# Patient Record
Sex: MALE | Race: WHITE | Employment: FULL TIME | URBAN - METROPOLITAN AREA
[De-identification: names, ages, dates, MRNs, and addresses within clinical notes are randomized per-mention and may not be internally consistent; named-entity substitution may affect disease eponyms.]

---

## 2017-02-21 ENCOUNTER — HOSPITAL ENCOUNTER (INPATIENT)
Facility: HOSPITAL | Age: 72
LOS: 1 days | Discharge: HOME | DRG: 190 | End: 2017-02-22
Attending: EMERGENCY MEDICINE | Admitting: HOSPITALIST
Payer: MEDICARE

## 2017-02-21 ENCOUNTER — APPOINTMENT (OUTPATIENT)
Dept: GENERAL RADIOLOGY | Facility: HOSPITAL | Age: 72
DRG: 190 | End: 2017-02-21
Attending: EMERGENCY MEDICINE
Payer: MEDICARE

## 2017-02-21 DIAGNOSIS — J18.9 COMMUNITY ACQUIRED PNEUMONIA: Primary | ICD-10-CM

## 2017-02-21 PROBLEM — D72.829 LEUKOCYTOSIS: Status: ACTIVE | Noted: 2017-02-21

## 2017-02-21 PROBLEM — D69.6 THROMBOCYTOPENIA (HCC): Status: ACTIVE | Noted: 2017-02-21

## 2017-02-21 PROBLEM — E87.6 HYPOKALEMIA: Status: ACTIVE | Noted: 2017-02-21

## 2017-02-21 PROBLEM — R73.9 HYPERGLYCEMIA: Status: ACTIVE | Noted: 2017-02-21

## 2017-02-21 LAB
ALBUMIN SERPL-MCNC: 3.6 G/DL (ref 3.5–4.8)
ALP LIVER SERPL-CCNC: 36 U/L (ref 45–117)
ALT SERPL-CCNC: 50 U/L (ref 17–63)
AST SERPL-CCNC: 25 U/L (ref 15–41)
ATRIAL RATE: 89 BPM
BASOPHILS # BLD AUTO: 0.04 X10(3) UL (ref 0–0.1)
BASOPHILS NFR BLD AUTO: 0.2 %
BILIRUB SERPL-MCNC: 1.8 MG/DL (ref 0.1–2)
BILIRUB UR QL STRIP.AUTO: NEGATIVE
BUN BLD-MCNC: 19 MG/DL (ref 8–20)
CALCIUM BLD-MCNC: 8.8 MG/DL (ref 8.3–10.3)
CHLORIDE: 104 MMOL/L (ref 101–111)
CLARITY UR REFRACT.AUTO: CLEAR
CO2: 30 MMOL/L (ref 22–32)
COLOR UR AUTO: YELLOW
CREAT BLD-MCNC: 1.06 MG/DL (ref 0.7–1.3)
EOSINOPHIL # BLD AUTO: 0 X10(3) UL (ref 0–0.3)
EOSINOPHIL NFR BLD AUTO: 0 %
ERYTHROCYTE [DISTWIDTH] IN BLOOD BY AUTOMATED COUNT: 12.9 % (ref 11.5–16)
EST. AVERAGE GLUCOSE BLD GHB EST-MCNC: 146 MG/DL (ref 68–126)
GLUCOSE BLD-MCNC: 105 MG/DL (ref 65–99)
GLUCOSE BLD-MCNC: 139 MG/DL (ref 70–99)
GLUCOSE BLD-MCNC: 211 MG/DL (ref 65–99)
GLUCOSE UR STRIP.AUTO-MCNC: NEGATIVE MG/DL
HBA1C MFR BLD HPLC: 6.7 % (ref ?–5.7)
HCT VFR BLD AUTO: 40 % (ref 37–53)
HGB BLD-MCNC: 14.2 G/DL (ref 13–17)
IMMATURE GRANULOCYTE COUNT: 0.12 X10(3) UL (ref 0–1)
IMMATURE GRANULOCYTE RATIO %: 0.6 %
KETONES UR STRIP.AUTO-MCNC: NEGATIVE MG/DL
LACTIC ACID: 2 MMOL/L (ref 0.5–2)
LEUKOCYTE ESTERASE UR QL STRIP.AUTO: NEGATIVE
LYMPHOCYTES # BLD AUTO: 0.82 X10(3) UL (ref 0.9–4)
LYMPHOCYTES NFR BLD AUTO: 4.3 %
M PROTEIN MFR SERPL ELPH: 6.7 G/DL (ref 6.1–8.3)
MCH RBC QN AUTO: 31.4 PG (ref 27–33.2)
MCHC RBC AUTO-ENTMCNC: 35.5 G/DL (ref 31–37)
MCV RBC AUTO: 88.5 FL (ref 80–99)
MONOCYTES # BLD AUTO: 1.67 X10(3) UL (ref 0.1–0.6)
MONOCYTES NFR BLD AUTO: 8.8 %
NEUTROPHIL ABS PRELIM: 16.3 X10 (3) UL (ref 1.3–6.7)
NEUTROPHILS # BLD AUTO: 16.3 X10(3) UL (ref 1.3–6.7)
NEUTROPHILS NFR BLD AUTO: 86.1 %
NITRITE UR QL STRIP.AUTO: NEGATIVE
P AXIS: 57 DEGREES
P-R INTERVAL: 210 MS
PH UR STRIP.AUTO: 6 [PH] (ref 4.5–8)
PLATELET # BLD AUTO: 125 10(3)UL (ref 150–450)
POTASSIUM SERPL-SCNC: 3.5 MMOL/L (ref 3.6–5.1)
POTASSIUM SERPL-SCNC: 3.9 MMOL/L (ref 3.6–5.1)
PROT UR STRIP.AUTO-MCNC: 30 MG/DL
Q-T INTERVAL: 366 MS
QRS DURATION: 90 MS
QTC CALCULATION (BEZET): 445 MS
R AXIS: 22 DEGREES
RBC # BLD AUTO: 4.52 X10(6)UL (ref 3.8–5.8)
RBC UR QL AUTO: NEGATIVE
RED CELL DISTRIBUTION WIDTH-SD: 42.1 FL (ref 35.1–46.3)
RESPIRATORY PANEL NEG:: NEGATIVE
SODIUM SERPL-SCNC: 141 MMOL/L (ref 136–144)
SP GR UR STRIP.AUTO: 1.02 (ref 1–1.03)
T AXIS: 71 DEGREES
UROBILINOGEN UR STRIP.AUTO-MCNC: <2 MG/DL
VENTRICULAR RATE: 89 BPM
WBC # BLD AUTO: 19 X10(3) UL (ref 4–13)

## 2017-02-21 PROCEDURE — 99223 1ST HOSP IP/OBS HIGH 75: CPT | Performed by: INTERNAL MEDICINE

## 2017-02-21 PROCEDURE — 71010 XR CHEST AP PORTABLE  (CPT=71010): CPT

## 2017-02-21 RX ORDER — POTASSIUM CHLORIDE 20 MEQ/1
40 TABLET, EXTENDED RELEASE ORAL EVERY 4 HOURS
Status: COMPLETED | OUTPATIENT
Start: 2017-02-21 | End: 2017-02-21

## 2017-02-21 RX ORDER — SODIUM CHLORIDE 9 MG/ML
INJECTION, SOLUTION INTRAVENOUS CONTINUOUS
Status: ACTIVE | OUTPATIENT
Start: 2017-02-21 | End: 2017-02-21

## 2017-02-21 RX ORDER — LISINOPRIL 20 MG/1
20 TABLET ORAL DAILY
COMMUNITY

## 2017-02-21 RX ORDER — CHOLECALCIFEROL (VITAMIN D3) 50 MCG
1 CAPSULE ORAL DAILY
COMMUNITY

## 2017-02-21 RX ORDER — ENOXAPARIN SODIUM 100 MG/ML
40 INJECTION SUBCUTANEOUS DAILY
Status: DISCONTINUED | OUTPATIENT
Start: 2017-02-21 | End: 2017-02-22

## 2017-02-21 RX ORDER — PRAVASTATIN SODIUM 10 MG
10 TABLET ORAL NIGHTLY
COMMUNITY
End: 2017-02-21

## 2017-02-21 RX ORDER — MELOXICAM 10 MG/1
7.5 CAPSULE ORAL DAILY
COMMUNITY
End: 2017-02-21

## 2017-02-21 RX ORDER — ONDANSETRON 2 MG/ML
4 INJECTION INTRAMUSCULAR; INTRAVENOUS ONCE
Status: COMPLETED | OUTPATIENT
Start: 2017-02-21 | End: 2017-02-21

## 2017-02-21 RX ORDER — ACETAMINOPHEN 500 MG
1000 TABLET ORAL ONCE
Status: COMPLETED | OUTPATIENT
Start: 2017-02-21 | End: 2017-02-21

## 2017-02-21 RX ORDER — ONDANSETRON 2 MG/ML
4 INJECTION INTRAMUSCULAR; INTRAVENOUS EVERY 6 HOURS PRN
Status: DISCONTINUED | OUTPATIENT
Start: 2017-02-21 | End: 2017-02-22

## 2017-02-21 RX ORDER — ACETAMINOPHEN 500 MG
1000 TABLET ORAL ONCE
Status: DISCONTINUED | OUTPATIENT
Start: 2017-02-21 | End: 2017-02-21

## 2017-02-21 RX ORDER — SODIUM CHLORIDE 9 MG/ML
1000 INJECTION, SOLUTION INTRAVENOUS ONCE
Status: COMPLETED | OUTPATIENT
Start: 2017-02-21 | End: 2017-02-21

## 2017-02-21 RX ORDER — SILDENAFIL 100 MG/1
100 TABLET, FILM COATED ORAL
COMMUNITY

## 2017-02-21 RX ORDER — ASPIRIN 81 MG/1
81 TABLET ORAL DAILY
COMMUNITY

## 2017-02-21 RX ORDER — PRAVASTATIN SODIUM 10 MG
10 TABLET ORAL NIGHTLY
Status: DISCONTINUED | OUTPATIENT
Start: 2017-02-21 | End: 2017-02-22

## 2017-02-21 RX ORDER — SODIUM CHLORIDE 9 MG/ML
INJECTION, SOLUTION INTRAVENOUS ONCE
Status: DISCONTINUED | OUTPATIENT
Start: 2017-02-21 | End: 2017-02-21

## 2017-02-21 RX ORDER — IBUPROFEN 400 MG/1
400 TABLET ORAL 4 TIMES DAILY
Status: DISCONTINUED | OUTPATIENT
Start: 2017-02-21 | End: 2017-02-22

## 2017-02-21 RX ORDER — SODIUM CHLORIDE 9 MG/ML
INJECTION, SOLUTION INTRAVENOUS CONTINUOUS
Status: DISCONTINUED | OUTPATIENT
Start: 2017-02-21 | End: 2017-02-22

## 2017-02-21 RX ORDER — IPRATROPIUM BROMIDE AND ALBUTEROL SULFATE 2.5; .5 MG/3ML; MG/3ML
3 SOLUTION RESPIRATORY (INHALATION) EVERY 4 HOURS PRN
Status: DISCONTINUED | OUTPATIENT
Start: 2017-02-21 | End: 2017-02-22

## 2017-02-21 RX ORDER — ACETAMINOPHEN 325 MG/1
650 TABLET ORAL EVERY 6 HOURS PRN
Status: DISCONTINUED | OUTPATIENT
Start: 2017-02-21 | End: 2017-02-22

## 2017-02-21 RX ORDER — MELOXICAM 7.5 MG/1
7.5 TABLET ORAL DAILY
COMMUNITY

## 2017-02-21 RX ORDER — LISINOPRIL 20 MG/1
20 TABLET ORAL DAILY
Status: DISCONTINUED | OUTPATIENT
Start: 2017-02-21 | End: 2017-02-22

## 2017-02-21 RX ORDER — PRAVASTATIN SODIUM 80 MG/1
40 TABLET ORAL NIGHTLY
COMMUNITY

## 2017-02-21 RX ORDER — DEXTROSE MONOHYDRATE 25 G/50ML
50 INJECTION, SOLUTION INTRAVENOUS
Status: DISCONTINUED | OUTPATIENT
Start: 2017-02-21 | End: 2017-02-22

## 2017-02-21 RX ORDER — ASPIRIN 81 MG/1
81 TABLET ORAL DAILY
Status: DISCONTINUED | OUTPATIENT
Start: 2017-02-21 | End: 2017-02-22

## 2017-02-21 RX ORDER — CHLORAL HYDRATE 500 MG
1000 CAPSULE ORAL DAILY
COMMUNITY

## 2017-02-21 RX ORDER — ACETAMINOPHEN 325 MG/1
325 TABLET ORAL EVERY 6 HOURS PRN
COMMUNITY

## 2017-02-21 RX ORDER — METOPROLOL SUCCINATE 50 MG/1
25 TABLET, EXTENDED RELEASE ORAL DAILY
COMMUNITY

## 2017-02-21 NOTE — PLAN OF CARE
Problem: Patient/Family Goals  Goal: Patient/Family Long Term Goal  Patient’s Long Term Goal: 2/21-resolve pna    Interventions:  2/21-iv abx  - See additional Care Plan goals for specific interventions   Outcome: Progressing

## 2017-02-21 NOTE — PLAN OF CARE
Diabetes/Glucose Control    • Glucose maintained within prescribed range Progressing        PAIN - ADULT    • Verbalizes/displays adequate comfort level or patient's stated pain goal Progressing        Patient/Family Goals    • Patient/Family [facility]

## 2017-02-21 NOTE — ED NOTES
Patient given turkey sandwich lunch pack and apple juice. Family at bedside. Patient updated on plan of care.

## 2017-02-21 NOTE — H&P
[facility] HOSPITALIST  History and Physical     Jacqualine Branch Patient Status:  Emergency    1945 MRN [id number]   Location [facility] Attending Ezra Pérez MD   Hosp Day # 0 PCP No primary care provider on file. bowel sounds  Neurologic: No focal neurological deficits. CNII-XII grossly intact. Musculoskeletal: Moves all extremities. Extremities: No edema or cyanosis. Integument: No rashes or lesions. Psychiatric: Appropriate mood and affect.       Diagnostic D

## 2017-02-21 NOTE — PROGRESS NOTES
Pt is a 79 y/o male admitted with PNA. alert oriented. denies any SOB. on room air 02 sat 95-98%. pt is on iv zithromax and Rocephin.getting IVF. no swallowing difficulty noted. PT,OT,speech consulted.k-replaced. no acute issues noted. will monitor closely.

## 2017-02-21 NOTE — PLAN OF CARE
Problem: Patient/Family Goals  Goal: Patient/Family Short Term Goal  Patient’s Short Term Goal: 2/21-Resolve pna    Interventions:   2/21-iv abx  - See additional Care Plan goals for specific interventions   Outcome: Progressing

## 2017-02-22 VITALS
OXYGEN SATURATION: 98 % | SYSTOLIC BLOOD PRESSURE: 137 MMHG | DIASTOLIC BLOOD PRESSURE: 61 MMHG | HEART RATE: 76 BPM | TEMPERATURE: 98 F | RESPIRATION RATE: 18 BRPM | WEIGHT: 236.19 LBS | HEIGHT: 72 IN | BODY MASS INDEX: 31.99 KG/M2

## 2017-02-22 LAB
BUN BLD-MCNC: 17 MG/DL (ref 8–20)
CALCIUM BLD-MCNC: 8.1 MG/DL (ref 8.3–10.3)
CHLORIDE: 108 MMOL/L (ref 101–111)
CO2: 26 MMOL/L (ref 22–32)
CREAT BLD-MCNC: 0.89 MG/DL (ref 0.7–1.3)
ERYTHROCYTE [DISTWIDTH] IN BLOOD BY AUTOMATED COUNT: 13.3 % (ref 11.5–16)
GLUCOSE BLD-MCNC: 106 MG/DL (ref 65–99)
GLUCOSE BLD-MCNC: 113 MG/DL (ref 65–99)
GLUCOSE BLD-MCNC: 140 MG/DL (ref 65–99)
GLUCOSE BLD-MCNC: 149 MG/DL (ref 70–99)
GLUCOSE BLD-MCNC: 169 MG/DL (ref 65–99)
HCT VFR BLD AUTO: 37.9 % (ref 37–53)
HGB BLD-MCNC: 12.7 G/DL (ref 13–17)
MCH RBC QN AUTO: 31.3 PG (ref 27–33.2)
MCHC RBC AUTO-ENTMCNC: 33.5 G/DL (ref 31–37)
MCV RBC AUTO: 93.3 FL (ref 80–99)
PLATELET # BLD AUTO: 102 10(3)UL (ref 150–450)
POTASSIUM SERPL-SCNC: 4.1 MMOL/L (ref 3.6–5.1)
RBC # BLD AUTO: 4.06 X10(6)UL (ref 3.8–5.8)
RED CELL DISTRIBUTION WIDTH-SD: 45.1 FL (ref 35.1–46.3)
SODIUM SERPL-SCNC: 141 MMOL/L (ref 136–144)
WBC # BLD AUTO: 14 X10(3) UL (ref 4–13)

## 2017-02-22 PROCEDURE — 99239 HOSP IP/OBS DSCHRG MGMT >30: CPT | Performed by: HOSPITALIST

## 2017-02-22 RX ORDER — AZITHROMYCIN 250 MG/1
500 TABLET, FILM COATED ORAL ONCE
Qty: 2 TABLET | Refills: 0 | Status: SHIPPED | OUTPATIENT
Start: 2017-02-23 | End: 2017-02-23

## 2017-02-22 RX ORDER — CEFUROXIME AXETIL 500 MG/1
500 TABLET ORAL 2 TIMES DAILY
Qty: 10 TABLET | Refills: 0 | Status: SHIPPED | OUTPATIENT
Start: 2017-02-22 | End: 2017-02-27

## 2017-02-22 RX ORDER — ATORVASTATIN CALCIUM 20 MG/1
20 TABLET, FILM COATED ORAL NIGHTLY
Status: DISCONTINUED | OUTPATIENT
Start: 2017-02-22 | End: 2017-02-22

## 2017-02-22 RX ORDER — METOPROLOL SUCCINATE 25 MG/1
25 TABLET, EXTENDED RELEASE ORAL
Status: DISCONTINUED | OUTPATIENT
Start: 2017-02-22 | End: 2017-02-22

## 2017-02-22 NOTE — OCCUPATIONAL THERAPY NOTE
OCCUPATIONAL THERAPY EVALUATION - INPATIENT     Room Number: 530/530-A  Evaluation Date: 2/22/2017  Type of Evaluation: Initial  Presenting Problem: PNA and fever     Physician Order: IP Consult to Occupational Therapy  Reason for Therapy: ADL/IADL Dysfunc limits    Upper extremity strength is within functional limits     COORDINATION  Gross Motor    WFL; tremor in B UE's     Fine Motor    WFL      ADDITIONAL TESTS                       Other Test(s): [place] IADL scale: 3/8              IADL  Pt scored a 3/8 reach. Pt's RN/PCT was made aware of pt's present position and condition. Patient End of Session: Up in chair;Needs met;Call light within reach;RN aware of session/findings; All patient questions and concerns addressed    ASSESSMENT     Patient is a 80 y

## 2017-02-22 NOTE — PHYSICAL THERAPY NOTE
PHYSICAL THERAPY EVALUATION - INPATIENT     Room Number: 530/530-A  Evaluation Date: 2/22/2017  Type of Evaluation: Initial  Physician Order: PT Eval and Treat    Presenting Problem: Pneumonia  Reason for Therapy: Mobility Dysfunction and Discharge Kerwin TOLERANCE  Room air    AM-PAC '6-Clicks' INPATIENT SHORT FORM - BASIC MOBILITY  How much difficulty does the patient currently have. ..  -   Turning over in bed (including adjusting bedclothes, sheets and blankets)?: None   -   Sitting down on and standing session/findings; All patient questions and concerns addressed    ASSESSMENT     Patient is a 80year old male admitted 2/21/2017 for pneumonia.    In this PT evaluation, the patient presents with the following impairments: impaired balance and activity tole

## 2017-02-22 NOTE — PLAN OF CARE
Diabetes/Glucose Control    • Glucose maintained within prescribed range Progressing        PAIN - ADULT    • Verbalizes/displays adequate comfort level or patient's stated pain goal Progressing        Patient/Family Goals    • Patient/Family [facility]

## 2017-02-22 NOTE — PROGRESS NOTES
SPO2% ON ROOM AIR AT REST 98%    SPO2% AMBULATION ON ROOM AIR 93%    SPO2% AMBULATION ON O2 93% ON 0 LITERS PER MINIUTE

## 2017-02-22 NOTE — PLAN OF CARE
Patient/Family Goals    • Patient/Family Long Term Goal Progressing    • Patient/Family Short Term Goal Progressing        RESPIRATORY - ADULT    • Achieves optimal ventilation and oxygenation Progressing        Problem: pneumonia  Data: Patient alert and

## 2017-02-22 NOTE — PROGRESS NOTES
Pt is a 81 y/o male admitted with PNA. alert oriented. denies any SOB. on room air 02 sat 95-98%. pt is on iv zithromax and Rocephin.getting IVF. no swallowing difficulty noted. PT,OT,speech consulted. no acute issues noted. will monitor closely.   Pt has diarrhea,

## 2017-02-22 NOTE — PROGRESS NOTES
SPO2% ON ROOM AIR AT REST 97%    SPO2% AMBULATION ON ROOM AIR 93%    SPO2% AMBULATION ON O2 93% ON 0 LITERS PER MINIUTE

## 2017-02-22 NOTE — PROGRESS NOTES
[facility] Pharmacy Note: Antimicrobial Weight Dose Adjustment for: Rocephin (ceftriaxone)    Caitlin Mckeon is a 80year old male who has been prescribed Rocephin (ceftriaxone). CrCl is estimated creatinine clearance is 84.8 mL/min (based on Cr of 0.89).  and

## 2017-02-22 NOTE — SLP NOTE
ADULT SWALLOWING EVALUATION    ASSESSMENT & PLAN   ASSESSMENT  Pt was seen for a BSSE per pneumonia protocol. Pt was adm with community acquired pneumonia.   Pt was sitting up in chair and conversational.  Pt reports feeling ill and tired for past few time Impression  CXR: 2/21/17     CONCLUSION:      Positive for pneumonia at the left lung base, patchy, either the lingula or left lower lobe. Right lung clear. Heart probably normal size for technique.  No sizable effusion, but small effusion difficult to ex UP     Number of Visits to Meet Established Goals: 0  Follow Up Needed: No       Thank you for your referral.   If you have any questions, please contact Mariposa Graves MS   CCC/SLP

## 2017-02-22 NOTE — PROGRESS NOTES
NURSING DISCHARGE NOTE    Discharged Home via Wheelchair. Accompanied by Support staff  Belongings Taken by patient/family. Pt discharged to home. discharge instruction given to pt,verbalized understanding,heplock discontinued. pts family take him home

## 2017-02-22 NOTE — PROGRESS NOTES
[facility] HOSPITALIST  Progress Note     [facility] Patient Status:  Inpatient    1945 MRN [id number]   [facility] 5NW-A Attending Iveth Torres MD   Hosp Day # 1 PCP No primary care provider on file.      Chief Complaint: dyspnea Oral Daily   • ibuprofen  400 mg Oral QID   • azithromycin  500 mg Intravenous Q24H   • enoxaparin  40 mg Subcutaneous Daily   • insulin aspart  1-5 Units Subcutaneous TID CC and HS       ASSESSMENT / PLAN:     1. PNA  1. Cont ABX  2. COPD  1. Stable  2.  C

## 2017-02-22 NOTE — ED PROVIDER NOTES
Patient Seen in: [facility] 5nw-a    History   Patient presents with:  Fever Sepsis (infectious)    Stated Complaint: flu    HPI    This is a 56-year-old male complaining of fever and cough the patient states he developed fever cough weakness starte HPI.  Constitutional and vital signs reviewed. All other systems reviewed and negative except as noted above. PSFH elements reviewed from today and agreed except as otherwise stated in HPI.     Physical Exam     ED Triage Vitals   BP 02/21/17 1156 1 following:     POC Glucose 105 (*)     All other components within normal limits   POCT GLUCOSE - Abnormal; Notable for the following:     POC Glucose 140 (*)     All other components within normal limits   POCT GLUCOSE - Abnormal; Notable for the followin POA    * (Principal)Community acquired pneumonia J18.9 2/21/2017 Yes    Hyperglycemia R73.9 2/21/2017 Yes    Hypokalemia E87.6 2/21/2017 Yes    Leukocytosis D72.829 2/21/2017 Yes    Thrombocytopenia ([address] D69.6 2/21/2017 Yes

## 2017-02-22 NOTE — PLAN OF CARE
Problem: Patient/Family Goals  Goal: Patient/Family Short Term Goal  Patient’s Short Term Goal: 2/21-Resolve pna  2/21 noc: get a good night’s sleep  2/22-resolve pna  Interventions:   2/22-iv abx,breath easy    2/21-iv abx  - See additional Care Plan goal

## 2017-02-23 NOTE — DISCHARGE SUMMARY
[facility] HOSPITALIST  DISCHARGE SUMMARY     Constantino Boles Patient Status:  Inpatient    1945 MRN [id number]   [facility] 5NW-A Attending No att. providers found   [address] Day # 1 PCP No primary care provider on file.      Date of Admissio mouth 2 (two) times daily.     Stop taking on:  2/27/2017   Quantity:  10 tablet   Refills:  0         CONTINUE taking these medications       Instructions Prescription details    acetaminophen 325 MG Tabs   Last time this was given:  1,000 mg on 2/21/2017 location directed by your doctor or nurse     Bring a paper prescription for each of these medications    - azithromycin 250 MG Tabs  - Cefuroxime Axetil 500 MG Tabs          Follow-up appointment: Your PCP    Schedule an appointment as soon as possible fo

## 2021-06-01 NOTE — IP AVS SNAPSHOT
Notified patient of results/note as listed below.mh  Message left   [facility] [place] [address] [facility], [address] ~ [phone]                Discharge Summary   2/21/2017    Jeffory Simmonds           Admission Information        Provider Department    2/21/2017 Shanna Valles MD  5nw-A Last time this was given:  81 mg on 2/22/2017  7:51 AM        Take 81 mg by mouth daily. Cholecalciferol 2000 units Caps        Take 2,000 Units by mouth daily.                             HM VITAMIN B COMPLEX/VITAMIN C Tabs Schedule an appointment as soon as possible for a visit with Your PCP. Immunization History as of 2/22/2017  Reviewed on 2/21/2017    No immunizations on file.       Recent Hematology Lab Results  (Last 3 results in the past 90 days)    WBC RBC Hemogl - If you are a smoker or have smoked in the last 12 months, we encourage you to explore options for quitting.     - If you have concerns related to behavioral health issues or thoughts of harming yourself, contact [facility] a provide you with additional printed information. Not all patients will experience these side effects or respond to medications the same. Please call your provider or healthcare team if you have any questions regarding your medications while at home. heartbeat    What to report to your healthcare team:  Low blood sugar (less than 70) twice a week or high blood sugar (greater than 200) for more than 2-3 days           Non-Narcotic Pain Medications     acetaminophen 325 MG Oral Tab    aspirin 81 MG Oral

## 2022-07-01 ENCOUNTER — TELEPHONE (OUTPATIENT)
Dept: SURGERY | Facility: CLINIC | Age: 77
End: 2022-07-01

## 2022-07-01 NOTE — TELEPHONE ENCOUNTER
Rec'd fax from [facility] for referral and medical records. Pt scheduled for NP ov for 9/21/22. Placing medical records in nurses bin for pickup.

## 2022-07-29 ENCOUNTER — TELEPHONE (OUTPATIENT)
Dept: NEUROLOGY | Facility: CLINIC | Age: 77
End: 2022-07-29

## 2022-07-29 NOTE — TELEPHONE ENCOUNTER
Spoke with samra Comer to add patient on to schedule for 8/11/2022 at 4 pm.    Routed to  to create opening in schedule.

## 2022-08-11 ENCOUNTER — OFFICE VISIT (OUTPATIENT)
Dept: NEUROLOGY | Facility: CLINIC | Age: 77
End: 2022-08-11
Payer: OTHER GOVERNMENT

## 2022-08-11 VITALS
BODY MASS INDEX: 28 KG/M2 | HEART RATE: 71 BPM | WEIGHT: 204 LBS | DIASTOLIC BLOOD PRESSURE: 67 MMHG | SYSTOLIC BLOOD PRESSURE: 127 MMHG

## 2022-08-11 DIAGNOSIS — G25.0 ESSENTIAL TREMOR: Primary | ICD-10-CM

## 2022-08-11 DIAGNOSIS — G62.9 NEUROPATHY: ICD-10-CM

## 2022-08-11 RX ORDER — ALPRAZOLAM 0.5 MG/1
TABLET ORAL
COMMUNITY
Start: 2022-07-14

## 2022-08-11 RX ORDER — LISINOPRIL AND HYDROCHLOROTHIAZIDE 25; 20 MG/1; MG/1
1 TABLET ORAL DAILY
COMMUNITY
Start: 2022-06-01

## 2022-08-11 NOTE — PROGRESS NOTES
Patient reports he has neuropathy in the bottom of both feet that began 6-8 years ago. He reports numbness in the bottom of his feet that he notes has been worsening. Patient also reports he has had tremors for a \"long time\". He notes both hands have tremors, he reports he had a severe virus in June and July of 2022 that worsened tremors.

## 2022-08-18 ENCOUNTER — TELEPHONE (OUTPATIENT)
Dept: NEUROLOGY | Facility: CLINIC | Age: 77
End: 2022-08-18

## 2022-08-18 DIAGNOSIS — G25.0 ESSENTIAL TREMOR: Primary | ICD-10-CM

## 2022-08-18 RX ORDER — PRIMIDONE 50 MG/1
50 TABLET ORAL NIGHTLY
Qty: 90 TABLET | Refills: 1 | Status: SHIPPED | OUTPATIENT
Start: 2022-08-18

## 2022-08-18 NOTE — TELEPHONE ENCOUNTER
Per discussion with provider, patient to take 50 mg of Mysoline nightly. Ordered to patient's preferred pharmacy.

## 2022-11-03 ENCOUNTER — TELEPHONE (OUTPATIENT)
Dept: SURGERY | Facility: CLINIC | Age: 77
End: 2022-11-03

## 2022-11-03 NOTE — TELEPHONE ENCOUNTER
Patient calling to request refill of [facility], [address] [phone], [address] [facility]         Patient informed of 48 hour refill policy excluding weekends and holidays. Informed patient prescription is sent directly to pharmacy. Further explained patient will not receive a call back once prescription is ready.

## 2022-11-03 NOTE — TELEPHONE ENCOUNTER
Per chart review, pt has refill on file.   Verified with pharmacist, states they will call pt when ready for pickup

## 2022-11-12 DIAGNOSIS — G25.0 ESSENTIAL TREMOR: ICD-10-CM

## 2022-11-14 RX ORDER — PRIMIDONE 50 MG/1
TABLET ORAL
Qty: 90 TABLET | Refills: 0 | OUTPATIENT
Start: 2022-11-14

## 2022-11-16 ENCOUNTER — TELEPHONE (OUTPATIENT)
Dept: SURGERY | Facility: CLINIC | Age: 77
End: 2022-11-16

## 2022-11-16 NOTE — TELEPHONE ENCOUNTER
Pt's son called; Pt was doing very well. Last week he had an episode at work. He fell against the wall and slumped down to the floor. Now he has dizziness and is shaking very bad. He is taking 100 mg of his medication all at night, rather than 50 morning, 50 at night. Please advise.

## 2022-11-17 NOTE — TELEPHONE ENCOUNTER
Provider spoke with patient today 11/17/2022. Routed to provider for documentation of phone conversation. RN witnessed conversation and patient was advised to go to ER.

## 2022-12-07 ENCOUNTER — TELEPHONE (OUTPATIENT)
Dept: NEUROLOGY | Facility: CLINIC | Age: 77
End: 2022-12-07

## 2022-12-07 NOTE — TELEPHONE ENCOUNTER
I tried calling Mr. Елена Buckley back regarding his new symptoms. He did not answer and I left a VM on his machine.

## 2023-02-10 DIAGNOSIS — G25.0 ESSENTIAL TREMOR: ICD-10-CM

## 2023-02-10 RX ORDER — PRIMIDONE 50 MG/1
TABLET ORAL
Qty: 90 TABLET | Refills: 0 | Status: SHIPPED | OUTPATIENT
Start: 2023-02-10

## 2023-03-13 ENCOUNTER — TELEPHONE (OUTPATIENT)
Dept: NEUROLOGY | Facility: CLINIC | Age: 78
End: 2023-03-13

## 2023-03-13 NOTE — TELEPHONE ENCOUNTER
Called pt and offered appt on 3/15/2023 at 1100. Pt states he is unable to come to any appt unless it is after 3:15 pt as he works 7 am -3 pm.    Pt states he has an appt at the [place] and he would like to discuss his Parkinsonian like features as the [place] is currently doing a payment to people with symptoms like this. RN advised that pt that RN would continue to review schedule for any appts that open in the afternoon.  GEGE

## 2023-04-16 DIAGNOSIS — G25.0 ESSENTIAL TREMOR: ICD-10-CM

## 2023-04-18 RX ORDER — PRIMIDONE 50 MG/1
TABLET ORAL
Qty: 90 TABLET | Refills: 0 | Status: SHIPPED | OUTPATIENT
Start: 2023-04-18

## 2023-04-25 ENCOUNTER — OFFICE VISIT (OUTPATIENT)
Dept: NEUROLOGY | Facility: CLINIC | Age: 78
End: 2023-04-25
Payer: OTHER GOVERNMENT

## 2023-04-25 VITALS
DIASTOLIC BLOOD PRESSURE: 78 MMHG | RESPIRATION RATE: 18 BRPM | HEART RATE: 72 BPM | WEIGHT: 185 LBS | SYSTOLIC BLOOD PRESSURE: 131 MMHG | BODY MASS INDEX: 25 KG/M2

## 2023-04-25 DIAGNOSIS — G25.0 ESSENTIAL TREMOR: ICD-10-CM

## 2023-04-25 PROCEDURE — 99213 OFFICE O/P EST LOW 20 MIN: CPT | Performed by: OTHER

## 2023-04-25 PROCEDURE — 3078F DIAST BP <80 MM HG: CPT | Performed by: OTHER

## 2023-04-25 PROCEDURE — 3075F SYST BP GE 130 - 139MM HG: CPT | Performed by: OTHER

## 2023-04-25 RX ORDER — PRIMIDONE 50 MG/1
150 TABLET ORAL NIGHTLY
Qty: 270 TABLET | Refills: 1 | Status: CANCELLED | OUTPATIENT
Start: 2023-04-25

## 2023-04-25 RX ORDER — PRIMIDONE 250 MG/1
250 TABLET ORAL DAILY
Qty: 90 TABLET | Refills: 1 | Status: SHIPPED | OUTPATIENT
Start: 2023-04-25

## 2023-04-25 NOTE — PROGRESS NOTES
Pt reports that his tremors have been stable. Pt would like new prescription as he states he is now taking 150 mg of Primidone. Chart reflects 50 mg nightly.

## 2023-10-30 DIAGNOSIS — G25.0 ESSENTIAL TREMOR: ICD-10-CM

## 2023-10-30 RX ORDER — PRIMIDONE 250 MG/1
250 TABLET ORAL DAILY
Qty: 30 TABLET | Refills: 0 | Status: SHIPPED | OUTPATIENT
Start: 2023-10-30

## 2023-10-30 NOTE — TELEPHONE ENCOUNTER
Medication: Primidone     Date of last refill: 4/25/23 (#90/1)  [place]      Last office visit: 4/25/2023  Due back to clinic per last office note:  4 months  Date next office visit scheduled:    Future Appointments   Date Time Provider [place]   11/2/2023  2:00 PM DO SHARLENE Krause [address] [place] [address] note recommendation:    1.  Essential Tremors  - Mysoline 250mg PO Qday      Return in about 4 months (around 8/25/2023

## 2023-10-30 NOTE — TELEPHONE ENCOUNTER
Pt calling to request prescription refill for     primidone 250 MG Oral Tab     [facility] [address] - [address] [place] [address], [phone]

## 2023-11-02 ENCOUNTER — OFFICE VISIT (OUTPATIENT)
Dept: NEUROLOGY | Facility: CLINIC | Age: 78
End: 2023-11-02
Payer: MEDICARE

## 2023-11-02 VITALS
HEART RATE: 76 BPM | RESPIRATION RATE: 17 BRPM | BODY MASS INDEX: 27 KG/M2 | SYSTOLIC BLOOD PRESSURE: 126 MMHG | DIASTOLIC BLOOD PRESSURE: 76 MMHG | WEIGHT: 200.63 LBS

## 2023-11-02 DIAGNOSIS — G25.0 ESSENTIAL TREMOR: ICD-10-CM

## 2023-11-02 PROCEDURE — 99213 OFFICE O/P EST LOW 20 MIN: CPT | Performed by: OTHER

## 2023-11-02 RX ORDER — ALLOPURINOL 300 MG/1
1 TABLET ORAL DAILY
COMMUNITY
Start: 2023-06-27

## 2023-11-02 RX ORDER — PRIMIDONE 250 MG/1
250 TABLET ORAL DAILY
Qty: 90 TABLET | Refills: 1 | Status: CANCELLED | OUTPATIENT
Start: 2023-11-02

## 2023-11-02 RX ORDER — PRIMIDONE 50 MG/1
TABLET ORAL
COMMUNITY
Start: 2022-11-18 | End: 2023-11-02 | Stop reason: ALTCHOICE

## 2023-11-02 RX ORDER — MECLIZINE HCL 12.5 MG/1
12.5 TABLET ORAL
COMMUNITY
Start: 2022-11-18

## 2023-11-02 RX ORDER — PRIMIDONE 125 MG/1
TABLET ORAL
Qty: 270 TABLET | Refills: 1 | Status: SHIPPED | OUTPATIENT
Start: 2023-11-02 | End: 2023-11-06

## 2023-11-02 NOTE — PROGRESS NOTES
Neurology H&P    Bang Davila Patient Status:  No patient class for patient encounter    1945 MRN [id number]   Location [address]  Attending No att. providers found   Hosp Day # 0 PCP No primary care provider on file. Subjective:  Initial Clinic HPI 22  Bang Davila is a(n) 69year old male with a PMH significant for COPD, HTN, HL and who comes to the neurology clinic for evaluation of tremors. He states that he has had these in his hands for a few years, He also reports that he has a neuropathy in the feet. He is a vet and reports that he was exposed to agent orange and is seeing someone at the [place] for this. He also feels that he had COVID in . He reports that he was very ill from this and that since that time he has had achiness in his joints and muscles. Tremors: He states that he has had fine motor tremors in his hands for years. He has a son with these similar tremors. He sleeps about 7-7.5 hours per night. He drinks 1 cup coffee per day. He only rarely drinks EtOH. He does not notice if his tremors are worse or better with lack of sleep or EtOH or caffeine. He does feel that they get worse when he is anxious or nervous. Neuropathy: He has had DM-II for maybe 6-7 years and also reports that he has been exposed to agent orange which he states can cause neuropathy. He states that this is not painful at all. He states that it feels fuzzy on the bottoms of his feet. He sometimes feels shkely when he stated up but has not had any falls. He uses a cane for balance. He is about to see PT. He has no numbness or tingling in the hands, He has no radiating neck or low back pains. Interim History:   Pt was last seen in the clinic on 2023. Since that he has been taking primidone which he states helps a lot with his tremors. He is taking 250mg Qday. He feels that his tremors are pretty well controlled.  He has bilateral rotator cuff problems which may be contributing to some pain and weakness. Current Medications:  Current Outpatient Medications   Medication Sig Dispense Refill    allopurinol 300 MG Oral Tab Take 1 tablet (300 mg total) by mouth daily. meclizine 12.5 MG Oral Tab 1 tablet (12.5 mg total). primidone 250 MG Oral Tab Take 1 tablet (250 mg total) by mouth daily. 30 tablet 0    lisinopril-hydroCHLOROthiazide 20-25 MG Oral Tab Take 1 tablet by mouth daily. acetaminophen 325 MG Oral Tab Take 1 tablet (325 mg total) by mouth every 6 (six) hours as needed for Pain. Pt takes two or three tablets daily for the last 2-3 years      Multiple Vitamins-Minerals (MULTIVITAL) Oral Tab Take 1 tablet by mouth daily. Nutritional Supplements (LIVER DEFENSE) Oral Tab Take 1 tablet by mouth daily. Metoprolol Succinate ER 50 MG Oral Tablet 24 Hr Take 0.5 tablets (25 mg total) by mouth daily. Pravastatin Sodium 80 MG Oral Tab Take 0.5 tablets (40 mg total) by mouth nightly. Sildenafil Citrate 100 MG Oral Tab Take 1 tablet (100 mg total) by mouth daily as needed for Erectile Dysfunction. Cholecalciferol 2000 units Oral Cap Take 2,000 Units by mouth daily. omega-3 fatty acids 1000 MG Oral Cap Take 1,000 mg by mouth daily. MetFORMIN HCl 1000 MG Oral Tab Take 0.5 tablets (500 mg total) by mouth 2 (two) times daily with meals.          Problem List:  Patient Active Problem List:     Hypokalemia     Hyperglycemia     Thrombocytopenia (HCC)     Leukocytosis     Community acquired pneumonia     Pure hypercholesterolemia     Chronic obstructive pulmonary disease (HCC)     Benign essential HTN     Essential tremor     Neuropathy      PMHx:  Past Medical History:   Diagnosis Date    COPD (chronic obstructive pulmonary disease) ([facility] [address]     Diabetes ([facility] [address]     Essential hypertension     Neuropathy     Tremors of nervous system        PSHx:  Past Surgical History:   Procedure Laterality Date    EXCIS LUMBAR [place] Barrier LEVEL         SocHx:  Social History     Socioeconomic History    Marital status:    Tobacco Use    Smoking status: Former    Smokeless tobacco: Never   Vaping Use    Vaping Use: Never used   Substance and Sexual Activity    Alcohol use: Yes     Comment: occ    Drug use: Never   Other Topics Concern    Caffeine Concern Yes     Comment: coffee daily    Exercise No       Family History:  No family history on file. Nio FH of PD    ROS:  10 point ROS completed and was negative, except for pertinent positive and negatives stated in subjective. Objective/Physical Exam:    Vital Signs:  Blood pressure 126/76, pulse 76, resp. rate 17, weight 200 lb 9.9 oz (91 kg). Gen: Awake and in no apparent distress  HEENT: moist mucus membranes  Neck: Supple  Cardiovascular: Regular rate and rhythm, no murmur  Pulm: CTAB  GI: non-tender, normal bowel sounds  Skin: normal, dry  Extremities: No clubbing or cyanosis      Neurologic:   MENTAL STATUS: alert, ox3, normal attention, language and fund of knowledge. CRANIAL NERVES II to XII: PERRLA, no ptosis or diplopia, EOM intact, facial sensation intact, strong eye closure, face is symmetric, no dysarthria, tongue midline,  no tongue fasciculations or atrophy, strong shoulder shrug. MOTOR EXAMINATION:   MSK:  RUE: Delt:  5/5, Bi: 5/5, Tri: 5/5, : 5/5   LUE: Delt:  5/5, Bi: 5/5, Tri: 5/5, : 5/5   RLE: HF: 5/5, KE: 5/5, KF: 5/5, DF: 5/5, PF: 5/5   LLE:  HF: 5/5, KE: 5/5, KF: 5/5, DF: 5/5, PF: 5/5   Normal Tone  No Fasiculations      SENSORY EXAMINATION:  UE: intact to light touch, pinprick intact  LE: intact to light touch, pinprick intact    COORDINATION:  No dysmetria, mild position fine tremor in the hands    REFLEXES: 2+ at biceps, 2+ brachioradialis, 2+ at patella, 2+ at the ankles. GAIT: normal stance, mildlly antalgic gait.  Uses cane or walker for hip pains but able to walk unassisted, mildly stooped posture, has spem trmor in the hand that is holding the cane    Romberg's: + sway        Labs:       Imaging:  No CNS imaging to review    Assessment: This is a 70 y/o male with several years of fine intermittent positional tremors in the hands. He again has no significant  parkinsonian features on exam. No hypomimia, no bradykinesia or cogwheeling rigidity, no shuffling gait or reduced arm swing. He has some mild tremros in his hands at rest which are intermittent and some in the hand that holds his cane as he walks. This appears clinically to be essential tremor. I will increase mysoline to 250mg in the mornings and 125mg in the afternoon      Plan:  1.  Essential Tremors  - Mysoline 250mg in the morning and 125mg in the afternoon       Ronald Yu, DO  Neurology

## 2023-11-06 ENCOUNTER — TELEPHONE (OUTPATIENT)
Dept: NEUROLOGY | Facility: CLINIC | Age: 78
End: 2023-11-06

## 2023-11-06 DIAGNOSIS — G25.0 ESSENTIAL TREMOR: Primary | ICD-10-CM

## 2023-11-06 NOTE — TELEPHONE ENCOUNTER
Pt would like 250 mg Primidone tablets due to lower cost.    Pended RX for provider review. Removed alternate prescription.

## 2023-11-06 NOTE — TELEPHONE ENCOUNTER
Pt went to p/u the new primidone 125 MG Oral Tab, the cost was $403.12 so he declined it to go back to 250mg. The cost is  $4.50 for 30 days or $13.50 for 90 days. He prefers the 90 day supply. Please advise, Pt's best call back number is [phone]. Endorsed to RN for Provider.

## 2023-11-07 RX ORDER — PRIMIDONE 250 MG/1
TABLET ORAL
Qty: 135 TABLET | Refills: 1 | Status: SHIPPED | OUTPATIENT
Start: 2023-11-07

## 2023-11-09 NOTE — TELEPHONE ENCOUNTER
Pt called for an update on his R request. Please advise, Pt's best call back number is [phone]. Endorsed to RN for Provider.

## 2023-11-09 NOTE — TELEPHONE ENCOUNTER
Left detailed message for pt informing him that RX was sent on 11/7/2023 to [address] RN confirmed with pharmacy that pt's RX for 250 mg Primidone tablets was received. Per pharmacist, received.

## 2023-11-17 ENCOUNTER — TELEPHONE (OUTPATIENT)
Dept: NEUROLOGY | Facility: CLINIC | Age: 78
End: 2023-11-17

## 2023-11-17 NOTE — TELEPHONE ENCOUNTER
Rec'd fax from [facility] requesting last ov notes. Faxed last ov notes to [facility] at [phone].  Rec'd fax confirmation

## 2024-01-15 DIAGNOSIS — G25.0 ESSENTIAL TREMOR: ICD-10-CM

## 2024-01-15 RX ORDER — PRIMIDONE 250 MG/1
TABLET ORAL
Qty: 135 TABLET | Refills: 1 | Status: SHIPPED | OUTPATIENT
Start: 2024-01-15

## 2024-01-15 NOTE — TELEPHONE ENCOUNTER
Medication: Primidone 250 mg     Date of last refill: 11/07/2023 (#135/1)  [place]     Last office visit: 11/02/2023  Due back to clinic per last office note:  not indicated  Date next office visit scheduled:    Future Appointments   Date Time Provider Department Center   3/5/2024  3:00 PM Santhosh Santoro DO [id number]           Last OV note recommendation:    Assessment:  This is a 76 y/o male with several years of fine intermittent positional tremors in the hands. He again has no significant  parkinsonian features on exam. No hypomimia, no bradykinesia or cogwheeling rigidity, no shuffling gait or reduced arm swing. He has some mild tremros in his hands at rest which are intermittent and some in the hand that holds his cane as he walks. This appears clinically to be essential tremor. I will increase mysoline to 250mg in the mornings and 125mg in the afternoon        Plan:  1. Essential Tremors  - Mysoline 250mg in the morning and 125mg in the afternoon

## 2024-03-05 ENCOUNTER — OFFICE VISIT (OUTPATIENT)
Dept: NEUROLOGY | Facility: CLINIC | Age: 79
End: 2024-03-05
Payer: MEDICARE

## 2024-03-05 VITALS
HEART RATE: 86 BPM | DIASTOLIC BLOOD PRESSURE: 78 MMHG | OXYGEN SATURATION: 96 % | BODY MASS INDEX: 27 KG/M2 | SYSTOLIC BLOOD PRESSURE: 130 MMHG | RESPIRATION RATE: 16 BRPM | WEIGHT: 200 LBS

## 2024-03-05 DIAGNOSIS — G62.9 NEUROPATHY: ICD-10-CM

## 2024-03-05 DIAGNOSIS — G25.0 ESSENTIAL TREMOR: Primary | ICD-10-CM

## 2024-03-05 PROCEDURE — 99213 OFFICE O/P EST LOW 20 MIN: CPT | Performed by: OTHER

## 2024-03-05 NOTE — PROGRESS NOTES
Neurology H&P    Eddie Greer Patient Status:  No patient class for patient encounter    1945 MRN [id number]   [facility], [address] [place] [address]-NEUROLOGY  Attending No att. providers found   Hosp Day # 0 PCP No primary care provider on file.     Subjective:  Initial Clinic HPI 22  Eddie Greer is a(n) 78 year old male with a PMH significant for COPD, HTN, HL and who comes to the neurology clinic for evaluation of tremors. He states that he has had these in his hands for a few years, He also reports that he has a neuropathy in the feet. 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 He also feels that he had COVID in . He reports that he was very ill from this and that since that time he has had achiness in his joints and muscles.    Tremors: He states that he has had fine motor tremors in his hands for years. He has a son with these similar tremors. He sleeps about 7-7.5 hours per night. He drinks 1 cup coffee per day. He only rarely drinks EtOH. He does not notice if his tremors are worse or better with lack of sleep or EtOH or caffeine. He does feel that they get worse when he is anxious or nervous.     Neuropathy: He has had DM-II for maybe 6-7 years and also reports that he has been exposed to agent orange which he states can cause neuropathy. He states that this is not painful at all. He states that it feels fuzzy on the bottoms of his feet. He sometimes feels shkely when he stated up but has not had any falls. He uses a cane for balance. He is about to see PT. He has no numbness or tingling in the hands, He has no radiating neck or low back pains.     Interim History:   Pt was last seen in the clinic on 2023. Since that he has been taking primidone which he states helps a lot with his tremors. He is taking 250mg Qday. He states that he feels that his tremors are under good control.       Current  Medications:  Current Outpatient Medications   Medication Sig Dispense Refill    primidone 250 MG Oral Tab Pt to take one 250 mg tablet in the morning and half of a tablet (0.5) in the afternoon for a dose of 125 mg. 135 tablet 1    allopurinol 300 MG Oral Tab Take 1 tablet (300 mg total) by mouth daily.      meclizine 12.5 MG Oral Tab 1 tablet (12.5 mg total).      lisinopril-hydroCHLOROthiazide 20-25 MG Oral Tab Take 1 tablet by mouth daily.      acetaminophen 325 MG Oral Tab Take 1 tablet (325 mg total) by mouth every 6 (six) hours as needed for Pain. Pt takes two or three tablets daily for the last 2-3 years      Multiple Vitamins-Minerals (MULTIVITAL) Oral Tab Take 1 tablet by mouth daily.      Nutritional Supplements (LIVER DEFENSE) Oral Tab Take 1 tablet by mouth daily.      Metoprolol Succinate ER 50 MG Oral Tablet 24 Hr Take 0.5 tablets (25 mg total) by mouth daily.      Pravastatin Sodium 80 MG Oral Tab Take 0.5 tablets (40 mg total) by mouth nightly.      Sildenafil Citrate 100 MG Oral Tab Take 1 tablet (100 mg total) by mouth daily as needed for Erectile Dysfunction.      Cholecalciferol 2000 units Oral Cap Take 2,000 Units by mouth daily.      omega-3 fatty acids 1000 MG Oral Cap Take 1,000 mg by mouth daily.      MetFORMIN HCl 1000 MG Oral Tab Take 0.5 tablets (500 mg total) by mouth 2 (two) times daily with meals.         Problem List:  Patient Active Problem List   Diagnosis    Hypokalemia    Hyperglycemia    Thrombocytopenia (HCC)    Leukocytosis    Community acquired pneumonia    Pure hypercholesterolemia    Chronic obstructive pulmonary disease (HCC)    Benign essential HTN    Essential tremor    Neuropathy       PMHx:  Past Medical History:   Diagnosis Date    COPD (chronic obstructive pulmonary disease) (HCC)     Diabetes (HCC)     Essential hypertension     Neuropathy     Tremors of nervous system        PSHx:  Past Surgical History:   Procedure Laterality Date    EXCIS LUMBAR DISK,ONE LEVEL          SocHx:  Social History     Socioeconomic History    Marital status:    Tobacco Use    Smoking status: Former    Smokeless tobacco: Never   Vaping Use    Vaping Use: Never used   Substance and Sexual Activity    Alcohol use: Yes     Comment: occ    Drug use: Never   Other Topics Concern    Caffeine Concern Yes     Comment: coffee daily    Exercise No       Family History:  No family history on file.    Nio FH of PD    ROS:  10 point ROS completed and was negative, except for pertinent positive and negatives stated in subjective.    Objective/Physical Exam:    Vital Signs:  Blood pressure 130/78, pulse 86, resp. rate 16, weight 200 lb (90.7 kg), SpO2 96%.    Gen: Awake and in no apparent distress  HEENT: moist mucus membranes  Neck: Supple  Cardiovascular: Regular rate and rhythm, no murmur  Pulm: CTAB  GI: non-tender, normal bowel sounds  Skin: normal, dry  Extremities: No clubbing or cyanosis      Neurologic:   MENTAL STATUS: alert, ox3, normal attention, language and fund of knowledge.      CRANIAL NERVES II to XII: PERRLA, no ptosis or diplopia, EOM intact, facial sensation intact, strong eye closure, face is symmetric, no dysarthria, tongue midline,  no tongue fasciculations or atrophy, strong shoulder shrug.    MOTOR EXAMINATION:   MSK:  RUE: Delt:  5/5, Bi: 5/5, Tri: 5/5, : 5/5   LUE: Delt:  5/5, Bi: 5/5, Tri: 5/5, : 5/5   RLE: HF: 5/5, KE: 5/5, KF: 5/5, DF: 5/5, PF: 5/5   LLE:  HF: 5/5, KE: 5/5, KF: 5/5, DF: 5/5, PF: 5/5   Normal Tone  No Fasiculations      SENSORY EXAMINATION:  UE: intact to light touch, pinprick intact  LE: intact to light touch, pinprick intact    COORDINATION:  No dysmetria, mild position fine tremor in the hands    REFLEXES: 2+ at biceps, 2+ brachioradialis, 2+ at patella, 2+ at the ankles.     GAIT: normal stance, mildlly antalgic gait. Uses cane or walker for hip pains but able to walk unassisted, mildly stooped posture, has spem trmor in the hand that is holding  the cane    Romberg's: + sway        Labs:       Imaging:  No CNS imaging to review    Assessment:  This is a 78 y/o male with several years of fine intermittent positional tremors in the hands. He again has no significant  parkinsonian features on exam. No hypomimia, no bradykinesia or cogwheeling rigidity, no shuffling gait or reduced arm swing. He has some mild tremros in his hands at rest which are intermittent and some in the hand that holds his cane as he walks. This appears clinically to be essential tremor. I will continue mysoline to 250mg in the mornings and 125mg in the afternoon. Other options would be Topamax or propranolol      Plan:  1. Essential Tremors  - Mysoline 250mg in the morning and 125mg in the afternoon    2. Gait imbalance  - likely multifactorial (neuropathy, deconditioning, arthritic changes and lumbar stenosis)     RTC in 6 months    Santhosh Santoro DO  Neurology

## 2024-03-05 NOTE — PATIENT INSTRUCTIONS
Refill policies:    Allow 2-3 business days for refills; controlled substances may take longer.  Contact your pharmacy at least 5 days prior to running out of medication and have them send an electronic request or submit request through the “request refill” option in your [employer] account.  Refills are not addressed on weekends; covering physicians do not authorize routine medications on weekends.  No narcotics or controlled substances are refilled after noon on Fridays or by on call physicians.  By law, narcotics must be electronically prescribed.  A 30 day supply with no refills is the maximum allowed.  If your prescription is due for a refill, you may be due for a follow up appointment.  To best provide you care, patients receiving routine medications need to be seen at least once a year.  Patients receiving narcotic/controlled substance medications need to be seen at least once every 3 months.  In the event that your preferred pharmacy does not have the requested medication in stock (e.g. Backordered), it is your responsibility to find another pharmacy that has the requested medication available.  We will gladly send a new prescription to that pharmacy at your request.    Scheduling Tests:    If your physician has ordered radiology tests such as MRI or CT scans, please contact Central Scheduling at [phone] right away to schedule the test.  Once scheduled, the [facility] Centralized Referral Team will work with your insurance carrier to obtain pre-certification or prior authorization.  Depending on your insurance carrier, approval may take 3-10 days.  It is highly recommended patients assure they have received an authorization before having a test performed.  If test is done without insurance authorization, patient may be responsible for the entire amount billed.      Precertification and Prior Authorizations:  If your physician has recommended that you have a procedure or additional testing performed the [facility]  Centralized Referral Team will contact your insurance carrier to obtain pre-certification or prior authorization.    You are strongly encouraged to contact your insurance carrier to verify that your procedure/test has been approved and is a COVERED benefit.  Although the [facility] Centralized Referral Team does its due diligence, the insurance carrier gives the disclaimer that \"Although the procedure is authorized, this does not guarantee payment.\"    Ultimately the patient is responsible for payment.   Thank you for your understanding in this matter.  Paperwork Completion:  If you require FMLA or disability paperwork for your recovery, please make sure to either drop it off or have it faxed to our office at [phone]. Be sure the form has your name and date of birth on it.  The form will be faxed to our Forms Department and they will complete it for you.  There is a 25$ fee for all forms that need to be filled out.  Please be aware there is a 10-14 day turnaround time.  You will need to sign a release of information (MADISON) form if your paperwork does not come with one.  You may call the Forms Department with any questions at [phone].  Their fax number is [phone].

## 2024-09-05 ENCOUNTER — OFFICE VISIT (OUTPATIENT)
Dept: NEUROLOGY | Facility: CLINIC | Age: 79
End: 2024-09-05
Payer: MEDICARE

## 2024-09-05 VITALS
HEART RATE: 79 BPM | WEIGHT: 200 LBS | SYSTOLIC BLOOD PRESSURE: 131 MMHG | DIASTOLIC BLOOD PRESSURE: 70 MMHG | BODY MASS INDEX: 27 KG/M2 | RESPIRATION RATE: 16 BRPM

## 2024-09-05 DIAGNOSIS — G25.0 ESSENTIAL TREMOR: Primary | ICD-10-CM

## 2024-09-05 DIAGNOSIS — R26.89 POOR BALANCE: ICD-10-CM

## 2024-09-05 PROCEDURE — 99213 OFFICE O/P EST LOW 20 MIN: CPT | Performed by: OTHER

## 2024-09-05 RX ORDER — IBUPROFEN 200 MG
600 TABLET ORAL 2 TIMES DAILY
COMMUNITY

## 2024-09-05 RX ORDER — PRIMIDONE 250 MG/1
TABLET ORAL
Qty: 135 TABLET | Refills: 2 | Status: SHIPPED | OUTPATIENT
Start: 2024-09-05

## 2024-09-05 NOTE — PATIENT INSTRUCTIONS
After your visit at the [facility] today,  please direct any follow up questions or medication needs to the staff in our [place] office so that your concerns may be promptly addressed.  We are available through [employer] or at the numbers below:    The phone number is:   [phone] option #1    The fax number is:  [phone]    Your pharmacy should also send any requests electronically to the [place] office.  Refill policies:    Allow 2-3 business days for refills; controlled substances may take longer.  Contact your pharmacy at least 5 days prior to running out of medication and have them send an electronic request or submit request through the “request refill” option in your [facility] [employer] account.  Refills are not addressed on weekends; covering physicians do not authorize routine medications on weekends.  No narcotics or controlled substances are refilled after noon on Fridays or by on call physicians.  By law, narcotics must be electronically prescribed.  A 30 day supply with no refills is the maximum allowed.  If your prescription is due for a refill, you may be due for a follow up appointment.  To best provide you care, patients receiving routine medications need to be seen at least once a year.  Patients receiving narcotic/controlled substance medications need to be seen at least once every 3 months.  In the event that your preferred pharmacy does not have the requested medication in stock (e.g. Backordered), it is your responsibility to find another pharmacy that has the requested medication available.  We will gladly send a new prescription to that pharmacy at your request.    Scheduling Tests:    If your physician has ordered radiology tests such as MRI or CT scans, please contact Central Scheduling at [phone] right away to schedule the test.  Once scheduled, the [facility] Centralized Referral Team will work with your insurance carrier to obtain pre-certification or prior authorization.   Depending on your insurance carrier, approval may take 3-10 days.  It is highly recommended patients assure they have received an authorization before having a test performed.  If test is done without insurance authorization, patient may be responsible for the entire amount billed.      Precertification and Prior Authorizations:  If your physician has recommended that you have a procedure or additional testing performed the [facility] Centralized Referral Team will contact your insurance carrier to obtain pre-certification or prior authorization.    You are strongly encouraged to contact your insurance carrier to verify that your procedure/test has been approved and is a COVERED benefit.  Although the [facility] Centralized Referral Team does its due diligence, the insurance carrier gives the disclaimer that \"Although the procedure is authorized, this does not guarantee payment.\"    Ultimately the patient is responsible for payment.   Thank you for your understanding in this matter.  Paperwork Completion:  If you require FMLA or disability paperwork for your recovery, please make sure to either drop it off or have it faxed to our office at [phone]. Be sure the form has your name and date of birth on it.  The form will be faxed to our Forms Department and they will complete it for you.  There is a 25$ fee for all forms that need to be filled out.  Please be aware there is a 10-14 day turnaround time.  You will need to sign a release of information (MADISON) form if your paperwork does not come with one.  You may call the Forms Department with any questions at [phone].  Their fax number is [phone].

## 2024-09-05 NOTE — PROGRESS NOTES
Neurology H&P    Eddie Greer Patient Status:  No patient class for patient encounter    1945 MRN [id number]   [facility], [address] [place] [address]-NEUROLOGY  Attending No att. providers found   Hosp Day # 0 PCP No primary care provider on file.     Subjective:  Initial Clinic HPI 22  Eddie Greer is a(n) 78 year old male with a PMH significant for COPD, HTN, HL and who comes to the neurology clinic for evaluation of tremors. He states that he has had these in his hands for a few years, He also reports that he has a neuropathy in the feet. 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 He also feels that he had COVID in . He reports that he was very ill from this and that since that time he has had achiness in his joints and muscles.    Tremors: He states that he has had fine motor tremors in his hands for years. He has a son with these similar tremors. He sleeps about 7-7.5 hours per night. He drinks 1 cup coffee per day. He only rarely drinks EtOH. He does not notice if his tremors are worse or better with lack of sleep or EtOH or caffeine. He does feel that they get worse when he is anxious or nervous.     Neuropathy: He has had DM-II for maybe 6-7 years and also reports that he has been exposed to agent orange which he states can cause neuropathy. He states that this is not painful at all. He states that it feels fuzzy on the bottoms of his feet. He sometimes feels shkely when he stated up but has not had any falls. He uses a cane for balance. He is about to see PT. He has no numbness or tingling in the hands, He has no radiating neck or low back pains.     Interim History:   Pt was last seen in the clinic on 3/5/2024. Since that he has been taking primidone which he states helps a lot with his tremors.He feels that his tremors are being well controlled with the mysoline. He states that he has no new numbness,  weakness or tingling. He is doing well at this time.     Current Medications:  Current Outpatient Medications   Medication Sig Dispense Refill    ibuprofen 200 MG Oral Tab Take 3 tablets (600 mg total) by mouth in the morning and 3 tablets (600 mg total) before bedtime. Pt reports he takes 600 mg two per choice.      primidone 250 MG Oral Tab Pt to take one 250 mg tablet in the morning and half of a tablet (0.5) in the afternoon for a dose of 125 mg. 135 tablet 1    allopurinol 300 MG Oral Tab Take 1 tablet (300 mg total) by mouth daily.      meclizine 12.5 MG Oral Tab 1 tablet (12.5 mg total) 3 (three) times daily as needed.      lisinopril-hydroCHLOROthiazide 20-25 MG Oral Tab Take 1 tablet by mouth daily.      acetaminophen 325 MG Oral Tab Take 1 tablet (325 mg total) by mouth every 6 (six) hours as needed for Pain. Pt takes two or three tablets daily for the last 2-3 years      Multiple Vitamins-Minerals (MULTIVITAL) Oral Tab Take 1 tablet by mouth daily.      Metoprolol Succinate ER 50 MG Oral Tablet 24 Hr Take 0.5 tablets (25 mg total) by mouth daily.      Pravastatin Sodium 80 MG Oral Tab Take 0.5 tablets (40 mg total) by mouth nightly.      Cholecalciferol 2000 units Oral Cap Take 2,000 Units by mouth daily.      omega-3 fatty acids 1000 MG Oral Cap Take 1,000 mg by mouth daily.      MetFORMIN HCl 1000 MG Oral Tab Take 0.5 tablets (500 mg total) by mouth 2 (two) times daily with meals.      Nutritional Supplements (LIVER DEFENSE) Oral Tab Take 1 tablet by mouth daily. (Patient not taking: Reported on 9/5/2024)      Sildenafil Citrate 100 MG Oral Tab Take 1 tablet (100 mg total) by mouth daily as needed for Erectile Dysfunction. (Patient not taking: Reported on 9/5/2024)         Problem List:  Patient Active Problem List   Diagnosis    Hypokalemia    Hyperglycemia    Thrombocytopenia (HCC)    Leukocytosis    Community acquired pneumonia    Pure hypercholesterolemia    Chronic obstructive pulmonary disease  (HCC)    Benign essential HTN    Essential tremor    Neuropathy       PMHx:  Past Medical History:    COPD (chronic obstructive pulmonary disease) (HCC)    Diabetes (HCC)    Essential hypertension    Neuropathy    Tremors of nervous system       PSHx:  Past Surgical History:   Procedure Laterality Date    Excis lumbar disk,one level         SocHx:  Social History     Socioeconomic History    Marital status:    Tobacco Use    Smoking status: Former    Smokeless tobacco: Never   Vaping Use    Vaping status: Never Used   Substance and Sexual Activity    Alcohol use: Yes     Comment: occ    Drug use: Never   Other Topics Concern    Caffeine Concern Yes     Comment: coffee daily    Exercise No     Social Determinants of Health      Received from [facility]       Family History:  No family history on file.    Nio FH of PD    ROS:  10 point ROS completed and was negative, except for pertinent positive and negatives stated in subjective.    Objective/Physical Exam:    Vital Signs:  Blood pressure 131/70, pulse 79, resp. rate 16, weight 200 lb (90.7 kg).    Gen: Awake and in no apparent distress  HEENT: moist mucus membranes  Neck: Supple  Cardiovascular: Regular rate and rhythm, no murmur  Pulm: CTAB  GI: non-tender, normal bowel sounds  Skin: normal, dry  Extremities: No clubbing or cyanosis      Neurologic:   MENTAL STATUS: alert, ox3, normal attention, language and fund of knowledge.      CRANIAL NERVES II to XII: PERRLA, no ptosis or diplopia, EOM intact, facial sensation intact, strong eye closure, face is symmetric, no dysarthria, tongue midline,  no tongue fasciculations or atrophy, strong shoulder shrug.    MOTOR EXAMINATION:   MSK:  RUE: Delt:  5/5, Bi: 5/5, Tri: 5/5, : 5/5   LUE: Delt:  5/5, Bi: 5/5, Tri: 5/5, : 5/5   RLE: HF: 5/5, KE: 5/5, KF: 5/5, DF: 5/5, PF: 5/5   LLE:  HF: 5/5, KE: 5/5, KF: 5/5, DF: 5/5, PF: 5/5   Normal Tone  No Fasiculations      SENSORY  EXAMINATION:  UE: intact to light touch, pinprick intact  LE: intact to light touch, pinprick intact    COORDINATION:  No dysmetria, mild position fine tremor in the hands    REFLEXES: 2+ at biceps, 2+ brachioradialis, 2+ at patella, 2+ at the ankles.     GAIT: normal stance, mildlly antalgic gait. Uses cane or walker for hip pains but able to walk unassisted, mildly stooped posture, has spem trmor in the hand that is holding the cane    Romberg's: + sway        Labs:       Imaging:  No CNS imaging to review    Assessment:  This is a 79 y/o male with several years of fine intermittent positional tremors in the hands. He again has no significant  parkinsonian features on exam. No hypomimia, no bradykinesia or cogwheeling rigidity, no shuffling gait or reduced arm swing. He has some mild tremros in his hands at rest which are intermittent and some in the hand that holds his cane as he walks. This appears clinically to be essential tremor. I will continue mysoline to 250mg in the mornings and 125mg in the afternoon. Other options would be Topamax or propranolol      Plan:  1. Essential Tremors  - Mysoline 250mg in the morning and 125mg in the afternoon    2. Gait imbalance  - likely multifactorial (neuropathy, deconditioning, arthritic changes and lumbar stenosis)  - PT referral given       RTC in 6-12 months    Santhosh Santoro DO  Neurology

## 2024-09-05 NOTE — PROGRESS NOTES
Pt reports he believes tremors have improved.  He states he has difficulty walking, he believes this could be due to his cataracts as it is difficult to see.